# Patient Record
Sex: FEMALE | Race: WHITE | NOT HISPANIC OR LATINO | Employment: OTHER | URBAN - METROPOLITAN AREA
[De-identification: names, ages, dates, MRNs, and addresses within clinical notes are randomized per-mention and may not be internally consistent; named-entity substitution may affect disease eponyms.]

---

## 2021-03-04 ENCOUNTER — IMMUNIZATIONS (OUTPATIENT)
Dept: FAMILY MEDICINE CLINIC | Facility: HOSPITAL | Age: 66
End: 2021-03-04

## 2021-03-04 DIAGNOSIS — Z23 ENCOUNTER FOR IMMUNIZATION: Primary | ICD-10-CM

## 2021-03-04 PROCEDURE — 0001A SARS-COV-2 / COVID-19 MRNA VACCINE (PFIZER-BIONTECH) 30 MCG: CPT

## 2021-03-04 PROCEDURE — 91300 SARS-COV-2 / COVID-19 MRNA VACCINE (PFIZER-BIONTECH) 30 MCG: CPT

## 2021-03-25 ENCOUNTER — IMMUNIZATIONS (OUTPATIENT)
Dept: FAMILY MEDICINE CLINIC | Facility: HOSPITAL | Age: 66
End: 2021-03-25

## 2021-03-25 DIAGNOSIS — Z23 ENCOUNTER FOR IMMUNIZATION: Primary | ICD-10-CM

## 2021-03-25 PROCEDURE — 0002A SARS-COV-2 / COVID-19 MRNA VACCINE (PFIZER-BIONTECH) 30 MCG: CPT

## 2021-03-25 PROCEDURE — 91300 SARS-COV-2 / COVID-19 MRNA VACCINE (PFIZER-BIONTECH) 30 MCG: CPT

## 2021-08-08 ENCOUNTER — OFFICE VISIT (OUTPATIENT)
Dept: URGENT CARE | Facility: CLINIC | Age: 66
End: 2021-08-08
Payer: MEDICARE

## 2021-08-08 VITALS
TEMPERATURE: 98.3 F | HEART RATE: 76 BPM | SYSTOLIC BLOOD PRESSURE: 118 MMHG | RESPIRATION RATE: 14 BRPM | WEIGHT: 143 LBS | DIASTOLIC BLOOD PRESSURE: 78 MMHG | OXYGEN SATURATION: 97 %

## 2021-08-08 DIAGNOSIS — H18.892 CORNEAL IRRITATION OF LEFT EYE: Primary | ICD-10-CM

## 2021-08-08 PROCEDURE — 99204 OFFICE O/P NEW MOD 45 MIN: CPT | Performed by: PHYSICIAN ASSISTANT

## 2021-08-08 PROCEDURE — G0463 HOSPITAL OUTPT CLINIC VISIT: HCPCS | Performed by: PHYSICIAN ASSISTANT

## 2021-08-08 RX ORDER — BACILLUS COAGULANS/INULIN 1B-250 MG
CAPSULE ORAL EVERY 24 HOURS
COMMUNITY

## 2021-08-08 NOTE — PATIENT INSTRUCTIONS
Do the cold compresses for 20 minutes 3-4x a day   Continue to flush   If you feel worse go to the ER otherwise follow with your eye  tomorrow   The [facility] is closest

## 2021-08-08 NOTE — PROGRESS NOTES
[id number]        NAME: Savannah Patricio is a 78 y o  female  : 1955    MRN: [id number]  DATE: 2021  TIME: 1:21 PM    Assessment and Plan   Corneal irritation of left eye [H18 [id number]]  1  Corneal irritation of left eye       ER if you do not improve or get worse by tonight       Patient Instructions   Patient Instructions   Do the cold compresses for 20 minutes 3-4x a day   Continue to flush   If you feel worse go to the ER otherwise follow with your eye dr tomorrow   The [facility] is closest         Follow up with PCP in 3-5 days  Proceed to  ER if symptoms worsen  Chief Complaint     Chief Complaint   Patient presents with    Eye Injury     accidently put hydrogen peroxide in left eye when attempting to remove contact lense 1 day ago  Rinsing eye with saline solution  Redness noted, not affecting vision         History of Present Illness       The patient is a 41-year-old female presenting today after accidentally rinsing her eye with hydrogen peroxide  She was attempting to remove a contact lens 1 day ago  She is doing rinses with saline solution but has noticed increased redness of the left eye  It is not affecting her vision and is not painful  The eye feels itchy now  This morning she woke up with discharge from the eye  Her eye is puffing out  She did do many saline rinses  The pt used a clear eye solution which is 3% hydrogen peroxide  Review of Systems   Review of Systems   Eyes: Positive for discharge, redness and itching  Negative for photophobia, pain and visual disturbance           Current Medications       Current Outpatient Medications:     Bacillus Coagulans-Inulin (Probiotic) 1-250 BILLION-MG CAPS, every 24 hours, Disp: , Rfl:     Cholecalciferol 25 MCG (1000 UT) capsule, Take 1,000 Units by mouth daily, Disp: , Rfl:     sertraline (Zoloft) 50 mg tablet, every 24 hours, Disp: , Rfl:     Current Allergies     Allergies as of 2021 - Reviewed 08/08/2021   Allergen Reaction Noted    Ampicillin Rash 10/22/2020    Sulfamethoxazole-trimethoprim Rash 08/08/2021            The following portions of the patient's history were reviewed and updated as appropriate: allergies, current medications, past family history, past medical history, past social history, past surgical history and problem list      History reviewed  No pertinent past medical history  History reviewed  No pertinent surgical history  History reviewed  No pertinent family history  Medications have been verified  Objective   /78   Pulse 76   Temp 98 3 °F (36 8 °C)   Resp 14   Wt 64 9 kg (143 lb)   SpO2 97%        Physical Exam     Physical Exam  Vitals reviewed  Constitutional:       General: She is not in acute distress  Appearance: Normal appearance  She is normal weight  She is not ill-appearing, toxic-appearing or diaphoretic  Eyes:      General: Lids are normal  No allergic shiner or visual field deficit  Right eye: No foreign body, discharge or hordeolum  Left eye: No foreign body, discharge or hordeolum  Extraocular Movements:      Right eye: Normal extraocular motion and no nystagmus  Left eye: Normal extraocular motion and no nystagmus  Conjunctiva/sclera:      Right eye: Right conjunctiva is injected  Chemosis present  No exudate or hemorrhage  Left eye: Left conjunctiva is not injected  No chemosis, exudate or hemorrhage  Neurological:      Mental Status: She is alert                 Eye stained and there are no corneal abrasions

## 2022-06-14 ENCOUNTER — APPOINTMENT (EMERGENCY)
Dept: RADIOLOGY | Facility: HOSPITAL | Age: 67
End: 2022-06-14
Payer: MEDICARE

## 2022-06-14 ENCOUNTER — APPOINTMENT (EMERGENCY)
Dept: CT IMAGING | Facility: HOSPITAL | Age: 67
End: 2022-06-14
Payer: MEDICARE

## 2022-06-14 ENCOUNTER — HOSPITAL ENCOUNTER (EMERGENCY)
Facility: HOSPITAL | Age: 67
Discharge: HOME | End: 2022-06-14
Attending: EMERGENCY MEDICINE | Admitting: EMERGENCY MEDICINE
Payer: MEDICARE

## 2022-06-14 VITALS
DIASTOLIC BLOOD PRESSURE: 67 MMHG | SYSTOLIC BLOOD PRESSURE: 149 MMHG | HEART RATE: 77 BPM | OXYGEN SATURATION: 97 % | RESPIRATION RATE: 17 BRPM | TEMPERATURE: 97.7 F

## 2022-06-14 DIAGNOSIS — N20.1 URETEROLITHIASIS: Primary | ICD-10-CM

## 2022-06-14 DIAGNOSIS — R93.89 ABNORMAL CT SCAN: ICD-10-CM

## 2022-06-14 LAB
ALBUMIN SERPL BCP-MCNC: 4.5 G/DL (ref 3.5–5)
ALP SERPL-CCNC: 81 U/L (ref 34–104)
ALT SERPL W P-5'-P-CCNC: 15 U/L (ref 7–52)
ANION GAP SERPL CALCULATED.3IONS-SCNC: 11 MMOL/L (ref 4–13)
APTT PPP: 31 SECONDS (ref 23–37)
AST SERPL W P-5'-P-CCNC: 23 U/L (ref 13–39)
BACTERIA UR QL AUTO: ABNORMAL /HPF
BASOPHILS # BLD AUTO: 0.04 THOUSANDS/ΜL (ref 0–0.1)
BASOPHILS NFR BLD AUTO: 0 % (ref 0–1)
BILIRUB SERPL-MCNC: 0.58 MG/DL (ref 0.2–1)
BILIRUB UR QL STRIP: NEGATIVE
BUN SERPL-MCNC: 15 MG/DL (ref 5–25)
CALCIUM SERPL-MCNC: 9.4 MG/DL (ref 8.4–10.2)
CHLORIDE SERPL-SCNC: 98 MMOL/L (ref 96–108)
CLARITY UR: ABNORMAL
CO2 SERPL-SCNC: 25 MMOL/L (ref 21–32)
COLOR UR: YELLOW
CREAT SERPL-MCNC: 0.69 MG/DL (ref 0.6–1.3)
EOSINOPHIL # BLD AUTO: 0.08 THOUSAND/ΜL (ref 0–0.61)
EOSINOPHIL NFR BLD AUTO: 1 % (ref 0–6)
ERYTHROCYTE [DISTWIDTH] IN BLOOD BY AUTOMATED COUNT: 13.6 % (ref 11.6–15.1)
GFR SERPL CREATININE-BSD FRML MDRD: 89 ML/MIN/1.73SQ M
GLUCOSE SERPL-MCNC: 107 MG/DL (ref 65–140)
GLUCOSE UR STRIP-MCNC: NEGATIVE MG/DL
HCT VFR BLD AUTO: 41.5 % (ref 34.8–46.1)
HGB BLD-MCNC: 13.4 G/DL (ref 11.5–15.4)
HGB UR QL STRIP.AUTO: ABNORMAL
IMM GRANULOCYTES # BLD AUTO: 0.05 THOUSAND/UL (ref 0–0.2)
IMM GRANULOCYTES NFR BLD AUTO: 0 % (ref 0–2)
INR PPP: 1.06 (ref 0.84–1.19)
KETONES UR STRIP-MCNC: ABNORMAL MG/DL
LEUKOCYTE ESTERASE UR QL STRIP: NEGATIVE
LYMPHOCYTES # BLD AUTO: 1.93 THOUSANDS/ΜL (ref 0.6–4.47)
LYMPHOCYTES NFR BLD AUTO: 16 % (ref 14–44)
MCH RBC QN AUTO: 29.6 PG (ref 26.8–34.3)
MCHC RBC AUTO-ENTMCNC: 32.3 G/DL (ref 31.4–37.4)
MCV RBC AUTO: 92 FL (ref 82–98)
MONOCYTES # BLD AUTO: 0.59 THOUSAND/ΜL (ref 0.17–1.22)
MONOCYTES NFR BLD AUTO: 5 % (ref 4–12)
NEUTROPHILS # BLD AUTO: 9.78 THOUSANDS/ΜL (ref 1.85–7.62)
NEUTS SEG NFR BLD AUTO: 78 % (ref 43–75)
NITRITE UR QL STRIP: NEGATIVE
NON-SQ EPI CELLS URNS QL MICRO: ABNORMAL /HPF
NRBC BLD AUTO-RTO: 0 /100 WBCS
PH UR STRIP.AUTO: 6 [PH]
PLATELET # BLD AUTO: 353 THOUSANDS/UL (ref 149–390)
PMV BLD AUTO: 10.5 FL (ref 8.9–12.7)
POTASSIUM SERPL-SCNC: 3.8 MMOL/L (ref 3.5–5.3)
PROT SERPL-MCNC: 7.8 G/DL (ref 6.4–8.4)
PROT UR STRIP-MCNC: NEGATIVE MG/DL
PROTHROMBIN TIME: 13.7 SECONDS (ref 11.6–14.5)
RBC # BLD AUTO: 4.53 MILLION/UL (ref 3.81–5.12)
RBC #/AREA URNS AUTO: ABNORMAL /HPF
SODIUM SERPL-SCNC: 134 MMOL/L (ref 135–147)
SP GR UR STRIP.AUTO: 1.02 (ref 1–1.03)
UROBILINOGEN UR QL STRIP.AUTO: 0.2 E.U./DL
WBC # BLD AUTO: 12.47 THOUSAND/UL (ref 4.31–10.16)
WBC #/AREA URNS AUTO: ABNORMAL /HPF

## 2022-06-14 PROCEDURE — 99285 EMERGENCY DEPT VISIT HI MDM: CPT | Performed by: EMERGENCY MEDICINE

## 2022-06-14 PROCEDURE — 80053 COMPREHEN METABOLIC PANEL: CPT | Performed by: EMERGENCY MEDICINE

## 2022-06-14 PROCEDURE — 99284 EMERGENCY DEPT VISIT MOD MDM: CPT

## 2022-06-14 PROCEDURE — 81001 URINALYSIS AUTO W/SCOPE: CPT | Performed by: EMERGENCY MEDICINE

## 2022-06-14 PROCEDURE — 81003 URINALYSIS AUTO W/O SCOPE: CPT | Performed by: EMERGENCY MEDICINE

## 2022-06-14 PROCEDURE — 36415 COLL VENOUS BLD VENIPUNCTURE: CPT | Performed by: EMERGENCY MEDICINE

## 2022-06-14 PROCEDURE — 96360 HYDRATION IV INFUSION INIT: CPT

## 2022-06-14 PROCEDURE — 85730 THROMBOPLASTIN TIME PARTIAL: CPT | Performed by: EMERGENCY MEDICINE

## 2022-06-14 PROCEDURE — 85610 PROTHROMBIN TIME: CPT | Performed by: EMERGENCY MEDICINE

## 2022-06-14 PROCEDURE — G1004 CDSM NDSC: HCPCS

## 2022-06-14 PROCEDURE — 85025 COMPLETE CBC W/AUTO DIFF WBC: CPT | Performed by: EMERGENCY MEDICINE

## 2022-06-14 PROCEDURE — 74176 CT ABD & PELVIS W/O CONTRAST: CPT

## 2022-06-14 PROCEDURE — 71045 X-RAY EXAM CHEST 1 VIEW: CPT

## 2022-06-14 RX ADMIN — SODIUM CHLORIDE 1000 ML: 0.9 INJECTION, SOLUTION INTRAVENOUS at 17:36

## 2022-06-14 NOTE — ED PROVIDER NOTES
History  Chief Complaint   Patient presents with    Flank Pain     Pt reports left flank pain that started this afternoon  Pt also reports urinary frequency since yesterday     Patient is a 75year old female who presents for evaluation of left-sided abdominal/left flank pain  Patient says that she initially had the discomfort on Sunday but went away  Patient says that the symptoms reoccurred today and were worse  She also admits to increased urinary frequency and feels like as soon as she goes she has to go again" and feels like she has increased urgency  She admitted to some pressure with urination but denies any hematuria, dysuria  She denies any fevers, chills, nausea, vomiting  The patient also admits to a chronic cough over the past 3 weeks, she saw her family doctor for and everything was normal   She denies any chest pain, shortness of breath  Prior to Admission Medications   Prescriptions Last Dose Informant Patient Reported? Taking? Bacillus Coagulans-Inulin (Probiotic) 1-250 BILLION-MG CAPS   Yes No   Sig: every 24 hours   Cholecalciferol 25 MCG (1000 UT) capsule   Yes No   Sig: Take 1,000 Units by mouth daily   sertraline (Zoloft) 50 mg tablet   Yes No   Sig: every 24 hours      Facility-Administered Medications: None       History reviewed  No pertinent past medical history  History reviewed  No pertinent surgical history  History reviewed  No pertinent family history  I have reviewed and agree with the history as documented  E-Cigarette/Vaping    E-Cigarette Use Never User      E-Cigarette/Vaping Substances     Social History     Tobacco Use    Smoking status: Never Smoker    Smokeless tobacco: Never Used   Vaping Use    Vaping Use: Never used   Substance Use Topics    Alcohol use: Not Currently    Drug use: Never       Review of Systems   Constitutional: Negative for fever and unexpected weight change     HENT: Negative for congestion, ear pain, sore throat and trouble swallowing  Eyes: Negative for pain and redness  Respiratory: Negative for cough, chest tightness and shortness of breath  Cardiovascular: Negative for chest pain and leg swelling  Gastrointestinal: Positive for abdominal pain (left sided)  Negative for abdominal distention, diarrhea and vomiting  Endocrine: Negative for polyuria  Genitourinary: Positive for flank pain, frequency and urgency  Negative for dysuria, hematuria, pelvic pain and vaginal bleeding  Musculoskeletal: Negative for back pain and myalgias  Skin: Negative for rash  Neurological: Negative for dizziness, syncope, weakness, light-headedness and headaches  Physical Exam  Physical Exam  Vitals and nursing note reviewed  Constitutional:       General: She is not in acute distress  Appearance: She is well-developed  HENT:      Head: Normocephalic and atraumatic  Right Ear: External ear normal       Left Ear: External ear normal       Nose: Nose normal       Mouth/Throat:      Mouth: Mucous membranes are moist       Pharynx: No oropharyngeal exudate  Eyes:      Conjunctiva/sclera: Conjunctivae normal       Pupils: Pupils are equal, round, and reactive to light  Cardiovascular:      Rate and Rhythm: Normal rate and regular rhythm  Heart sounds: Normal heart sounds  No murmur heard  No friction rub  No gallop  Pulmonary:      Effort: Pulmonary effort is normal  No respiratory distress  Breath sounds: Normal breath sounds  No wheezing or rales  Abdominal:      General: There is no distension  Palpations: Abdomen is soft  Tenderness: There is no abdominal tenderness  There is left CVA tenderness  There is no guarding  Musculoskeletal:         General: No swelling, tenderness or deformity  Normal range of motion  Cervical back: Normal range of motion and neck supple  Lymphadenopathy:      Cervical: No cervical adenopathy  Skin:     General: Skin is warm and dry  Neurological:      General: No focal deficit present  Mental Status: She is alert and oriented to person, place, and time  Mental status is at baseline  Cranial Nerves: No cranial nerve deficit  Sensory: No sensory deficit  Motor: No weakness or abnormal muscle tone        Coordination: Coordination normal          Vital Signs  ED Triage Vitals [06/14/22 1645]   Temperature Pulse Respirations Blood Pressure SpO2   97 7 °F (36 5 °C) 77 17 149/67 97 %      Temp Source Heart Rate Source Patient Position - Orthostatic VS BP Location FiO2 (%)   Tympanic Monitor -- Right arm --      Pain Score       7           Vitals:    06/14/22 1645   BP: 149/67   Pulse: 77         Visual Acuity      ED Medications  Medications   sodium chloride 0 9 % bolus 1,000 mL (0 mL Intravenous Stopped 6/14/22 1836)       Diagnostic Studies  Results Reviewed     Procedure Component Value Units Date/Time    Comprehensive metabolic panel [[id number]]  (Abnormal) Collected: 06/14/22 1736    Lab Status: Final result Specimen: Blood from Arm, Right Updated: 06/14/22 1806     Sodium 134 mmol/L      Potassium 3 8 mmol/L      Chloride 98 mmol/L      CO2 25 mmol/L      ANION GAP 11 mmol/L      BUN 15 mg/dL      Creatinine 0 69 mg/dL      Glucose 107 mg/dL      Calcium 9 4 mg/dL      AST 23 U/L      ALT 15 U/L      Alkaline Phosphatase 81 U/L      Total Protein 7 8 g/dL      Albumin 4 5 g/dL      Total Bilirubin 0 58 mg/dL      eGFR 89 ml/min/1 73sq m     Narrative:      [place] guidelines for Chronic Kidney Disease (CKD):     Stage 1 with normal or high GFR (GFR > 90 mL/min/1 73 square meters)    Stage 2 Mild CKD (GFR = 60-89 mL/min/1 73 square meters)    Stage 3A Moderate CKD (GFR = 45-59 mL/min/1 73 square meters)    Stage 3B Moderate CKD (GFR = 30-44 mL/min/1 73 square meters)    Stage 4 Severe CKD (GFR = 15-29 mL/min/1 73 square meters)    Stage 5 End Stage CKD (GFR <15 mL/min/1 73 square meters)  Note: GFR calculation is accurate only with a steady state creatinine    Protime-INR [[id number]]  (Normal) Collected: 06/14/22 1736    Lab Status: Final result Specimen: Blood from Arm, Right Updated: 06/14/22 1758     Protime 13 7 seconds      INR 1 06    APTT [[id number]]  (Normal) Collected: 06/14/22 1736    Lab Status: Final result Specimen: Blood from Arm, Right Updated: 06/14/22 1758     PTT 31 seconds     Urine Microscopic [[id number]]  (Abnormal) Collected: 06/14/22 1736    Lab Status: Final result Specimen: Urine, Clean Catch Updated: 06/14/22 1758     RBC, UA 30-50 /hpf      WBC, UA 1-2 /hpf      Epithelial Cells Occasional /hpf      Bacteria, UA None Seen /hpf     UA w Reflex to Microscopic w Reflex to Culture [[id number]]  (Abnormal) Collected: 06/14/22 1736    Lab Status: Final result Specimen: Urine, Clean Catch Updated: 06/14/22 1747     Color, UA Yellow     Clarity, UA Slightly Cloudy     Specific Gravity, UA 1 025     pH, UA 6 0     Leukocytes, UA Negative     Nitrite, UA Negative     Protein, UA Negative mg/dl      Glucose, UA Negative mg/dl      Ketones, UA 15 (1+) mg/dl      Urobilinogen, UA 0 2 E U /dl      Bilirubin, UA Negative     Blood, UA 3+    CBC and differential [[id number]]  (Abnormal) Collected: 06/14/22 1736    Lab Status: Final result Specimen: Blood from Arm, Right Updated: 06/14/22 1744     WBC 12 47 Thousand/uL      RBC 4 53 Million/uL      Hemoglobin 13 4 g/dL      Hematocrit 41 5 %      MCV 92 fL      MCH 29 6 pg      MCHC 32 3 g/dL      RDW 13 6 %      MPV 10 5 fL      Platelets 011 Thousands/uL      nRBC 0 /100 WBCs      Neutrophils Relative 78 %      Immat GRANS % 0 %      Lymphocytes Relative 16 %      Monocytes Relative 5 %      Eosinophils Relative 1 %      Basophils Relative 0 %      Neutrophils Absolute 9 78 Thousands/µL      Immature Grans Absolute 0 05 Thousand/uL      Lymphocytes Absolute 1 93 Thousands/µL      Monocytes Absolute 0 59 Thousand/µL      Eosinophils Absolute 0 08 Thousand/µL      Basophils Absolute 0 04 Thousands/µL                  XR chest 1 view   ED Interpretation by Gracie Lares DO (06/14 1721)   No acute cardiopulmonary disease       CT abdomen pelvis wo contrast   Final Result by Nenita Meyer MD (06/14 1737)   1   4 mm left UVJ calculus identified resulting in mild left hydroureteronephrosis without further urinary calculi  2   Expansile destructive right iliac bone lesion with soft tissue component and chondroid matrix measuring up to 3 5 x 3 9 cm 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 without additional bone lesion identified  Follow-up PET/CT would likely    be helpful for treatment planning  3   Cholelithiasis  4   Moderate to large hiatal hernia  The study was marked in EPIC for significant notification  Workstation performed: [id number]                    Procedures  Procedures         ED Course  ED Course as of 06/14/22 1957 Tue Jun 14, 2022 1816 Spoke with patient and  about the CT of the abdomen pelvis findings  Explained to them that there is a high concern for cancer  Explained to them the importance of following up with the hematologist oncologist in regards to these findings  They are understanding of the seriousness of these findings and the need for follow-up as soon as possible  In regards to the kidney stone, patient does not feels if she needs pain meds for home  She will continue with Tylenol Motrin  Told her it is important that she follows up with a urologist as well  MDM  Number of Diagnoses or Management Options  Abnormal CT scan  Ureterolithiasis  Diagnosis management comments: 59-year-old female presenting for evaluation of left flank/left-sided abdominal pain  Had episode on Sunday which went away  Symptoms returned today  Admits to increased urinary frequency and urgency  Denies fevers, chills, nausea, vomiting    Does have a history of kidney stones  Vitals within normal limits  CVA tenderness on exam, no abdominal tenderness  Will obtain basic labs, UA, CT abdomen pelvis without contrast   Patient declined pain meds at this time  Labs show mildly elevated white count  UA shows no signs of infection  Kidney function within normal limits  CT shows 4 mm nonobstructing stone  Patient's pain is controlled in department  Updated patient on other CT findings and the need for follow-up  See ED workup for further information  Disposition  Final diagnoses:   Ureterolithiasis   Abnormal CT scan     Time reflects when diagnosis was documented in both MDM as applicable and the Disposition within this note     Time User Action Codes Description Comment    6/14/2022  6:00 PM Nik Santamaria Add [N20 1] Ureterolithiasis     6/14/2022  6:03 PM Nik Santamaria Add [R93 89] Abnormal CT scan       ED Disposition     ED Disposition   Discharge    Condition   Stable    Date/Time   Tue Jun 14, 2022  6:00 PM    [address] discharge to home/self care                 Follow-up Information     Follow up With Specialties Details Why Contact Info    Erwin Jansen MD Urology Schedule an appointment as soon as possible for a visit  For follow up of kidney stone [address] [place] [address]      Roshan Gillis MD Hematology and Oncology, Hematology, Oncology Schedule an appointment as soon as possible for a visit  For follow up of CT abdomen findings [address]  [address] [address] [address]  [phone]            Discharge Medication List as of 6/14/2022  6:06 PM      CONTINUE these medications which have NOT CHANGED    Details   Bacillus Coagulans-Inulin (Probiotic) 1-250 BILLION-MG CAPS every 24 hours, Historical Med      Cholecalciferol 25 MCG (1000 UT) capsule Take 1,000 Units by mouth daily, Historical Med      sertraline (Zoloft) 50 mg tablet every 24 hours, Historical Med                 PDMP Review     None ED Provider  Electronically Signed by           Inderjit Kirk DO  06/14/22 1957

## 2022-06-22 ENCOUNTER — TELEPHONE (OUTPATIENT)
Dept: HEMATOLOGY ONCOLOGY | Facility: CLINIC | Age: 67
End: 2022-06-22

## 2022-06-27 ENCOUNTER — TELEPHONE (OUTPATIENT)
Dept: HEMATOLOGY ONCOLOGY | Facility: CLINIC | Age: 67
End: 2022-06-27

## 2022-06-27 NOTE — TELEPHONE ENCOUNTER
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 Advised this may not happen until apt even though she has been trying with her PCP and other docs from [facility]  Pt was told there is a sarcoma specialist in [facility] and wondering if she should go there to be seen?

## 2022-06-27 NOTE — TELEPHONE ENCOUNTER
CALL RETURN FORM   Reason for patient call? Patient has medical questions about ct scans and biopsy   Patient's primary oncologist?  Jorge Kang   Name of person the patient was calling for? agostino   Any additional information to add, if applicable? Please call [phone]   Informed patient that the message will be forwarded to the team and someone will get back to them as soon as possible    Did you relay this information to the patient?  yes

## 2022-06-28 ENCOUNTER — TELEPHONE (OUTPATIENT)
Dept: HEMATOLOGY ONCOLOGY | Facility: HOSPITAL | Age: 67
End: 2022-06-28

## 2022-06-28 NOTE — TELEPHONE ENCOUNTER
Returned call to pt and advised of 2pm appointment tomorrow to see Dr Marli Sutherland can we please schedule this pt to be seen by Dr Marli Hopson tomorrow at 2pm in [facility] thank you!

## 2022-06-29 ENCOUNTER — TELEPHONE (OUTPATIENT)
Dept: HEMATOLOGY ONCOLOGY | Facility: HOSPITAL | Age: 67
End: 2022-06-29

## 2022-06-29 ENCOUNTER — CONSULT (OUTPATIENT)
Dept: HEMATOLOGY ONCOLOGY | Facility: HOSPITAL | Age: 67
End: 2022-06-29
Attending: EMERGENCY MEDICINE
Payer: MEDICARE

## 2022-06-29 ENCOUNTER — PREP FOR PROCEDURE (OUTPATIENT)
Dept: INTERVENTIONAL RADIOLOGY/VASCULAR | Facility: CLINIC | Age: 67
End: 2022-06-29

## 2022-06-29 VITALS
HEART RATE: 75 BPM | TEMPERATURE: 97.2 F | WEIGHT: 143 LBS | RESPIRATION RATE: 16 BRPM | BODY MASS INDEX: 30.02 KG/M2 | SYSTOLIC BLOOD PRESSURE: 110 MMHG | DIASTOLIC BLOOD PRESSURE: 66 MMHG | OXYGEN SATURATION: 99 % | HEIGHT: 58 IN

## 2022-06-29 DIAGNOSIS — C41.4 MALIGNANT NEOPLASM OF PELVIC BONES, SACRUM AND COCCYX (HCC): Primary | ICD-10-CM

## 2022-06-29 DIAGNOSIS — R19.00 PELVIC MASS: Primary | ICD-10-CM

## 2022-06-29 DIAGNOSIS — R93.89 ABNORMAL CT SCAN: ICD-10-CM

## 2022-06-29 PROCEDURE — 99205 OFFICE O/P NEW HI 60 MIN: CPT | Performed by: INTERNAL MEDICINE

## 2022-06-29 NOTE — TELEPHONE ENCOUNTER
Called IR spoke to [facility] let me know their dr's need to review the order then will reach out to the patient to schedule  Patient is aware and will be waiting for the call

## 2022-06-30 ENCOUNTER — TELEPHONE (OUTPATIENT)
Dept: GENETICS | Facility: CLINIC | Age: 67
End: 2022-06-30

## 2022-06-30 ENCOUNTER — TELEPHONE (OUTPATIENT)
Dept: HEMATOLOGY ONCOLOGY | Facility: HOSPITAL | Age: 67
End: 2022-06-30

## 2022-06-30 NOTE — TELEPHONE ENCOUNTER
I called [facility] to schedule a new patient appointment with the [facility]       Outcome:   I left a voice message encouraging the patient to call the genetics team at [phone] to schedule this appointment  Follow-up: We will make one more attempt to reach the patient

## 2022-06-30 NOTE — TELEPHONE ENCOUNTER
Called patient and spoke to patient  IR moved up the biopsy appt and I moved up f/u appt  Patient was very happy the appts were moved up and was greatly appreciated of it

## 2022-06-30 NOTE — TELEPHONE ENCOUNTER
Called patient and spoke to patient  I let patient know f/u appt date and time and let patient know I called IR to see if they can move up her biopsy appt  IR will reach out to patient if they can move up appt  I let patient know that and then I would move up the f/u appt  Patient confirmed

## 2022-07-02 NOTE — H&P (VIEW-ONLY)
Oncology Outpatient Consult Note  Socorro Edwards 75 y o  female MRN: @ Encounter: [id number]        Date:  7/2/2022        CC:  Expansile destructive lesion in right iliac bone      HPI:  Veronica Valle is seen for initial consultation 7/2/2022 regarding an incidentally found explasive destructive lesion in the right iliac bone  She was in the ER in mid June with left sided flank pain and urinary frequency  She has a kidney stone on the left and the iliac lesions was also seen on CT  She has not had a biopsy yet because she was having difficulty getting an order for it, so I agreed to see her without a diagnosis  She had a PET scan at [place] that did not show any other sites of disease  She is otherwise healthy  Her only other medical history is depression  Her labs are normal   She had a normal mammogram in dec 2021 and a normal colonoscopy last year  She denies any pain  She has a dry cough that is improving on its own  She denies any bowel problems or BRBPR  She has fatigue  She doesn't smoke or drink  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 a myeloma survivor  Her famhx his remarkable for:    PGF - colon cancer age 80  Father - prostate cancer age 61 and a small cell carcinoma of the rectum age 79  Brother - prostate cancer age 76  Brother - prostate cancer in his 46s  MGM - uterine cancer in her [de-identified]    No AJ heritage  ROS: As stated in history of present illness otherwise her 14 point review of systems today was negative  ECOG PS: 0        Test Results:    Imaging: CT abdomen pelvis wo contrast    Result Date: 6/14/2022  Narrative: CT ABDOMEN AND PELVIS WITHOUT IV CONTRAST INDICATION:   Flank pain, kidney stone suspected left flank pain  COMPARISON:  None   TECHNIQUE:  CT examination of the abdomen and pelvis was performed without intravenous contrast  This examination was performed without intravenous contrast in the context of the critical nationwide Omnipaque shortage  Axial, sagittal, and coronal 2D reformatted images were created from the source data and submitted for interpretation  Radiation dose length product (DLP) for this visit:  538 36 mGy-cm   This examination, like all CT scans performed in the [facility], was performed utilizing techniques to minimize radiation dose exposure, including the use of iterative  reconstruction and automated exposure control  Enteric contrast was administered  FINDINGS: ABDOMEN LOWER CHEST:  Streaky atelectasis or scarring noted at the lung bases  Moderate to large sliding-type hiatal hernia noted  LIVER/BILIARY TREE:  Unremarkable  GALLBLADDER:  There are gallstone(s) within the gallbladder, without pericholecystic inflammatory changes  SPLEEN:  Unremarkable  PANCREAS:  Unremarkable  ADRENAL GLANDS:  Unremarkable  KIDNEYS/URETERS:  4 mm left UVJ calculus resulting in mild left hydroureteronephrosis  STOMACH AND BOWEL:  Unremarkable  APPENDIX:  A normal appendix was visualized  ABDOMINOPELVIC CAVITY:  No ascites  No pneumoperitoneum  No lymphadenopathy  VESSELS:  Unremarkable for patient's age  PELVIS REPRODUCTIVE ORGANS:  Unremarkable for patient's age  URINARY BLADDER:  Unremarkable  ABDOMINAL WALL/INGUINAL REGIONS:  Unremarkable  OSSEOUS STRUCTURES:  Right iliac bone lytic destructive lesion with soft tissue component measuring approximately 3 5 x 3 9 cm  Chondroid matrix noted  Findings raise suspicion for chondrosarcoma or metastatic disease  No additional bone lesions identified or suspected primary carcinoma  Impression: 1   4 mm left UVJ calculus identified resulting in mild left hydroureteronephrosis without further urinary calculi  2   Expansile destructive right iliac bone lesion with soft tissue component and chondroid matrix measuring up to 3 5 x 3 9 cm 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 without additional bone lesion identified    Follow-up PET/CT would likely be helpful for treatment planning  3   Cholelithiasis  4   Moderate to large hiatal hernia  The study was marked in EPIC for significant notification  Workstation performed: [id number]     XR chest 1 view    Result Date: 6/15/2022  Narrative: CHEST INDICATION:   chronic cough  COMPARISON:  None EXAM PERFORMED/VIEWS:  XR CHEST 1 VIEW 1 image FINDINGS: Cardiomediastinal silhouette appears unremarkable  A hiatal hernia is present  The lungs are clear  No pneumothorax or pleural effusion  Mild dextroscoliosis in the lumbar spine  Impression: No acute cardiopulmonary disease  Workstation performed: [id number]     NM PET outside images    Result Date: 6/28/2022  Narrative: 1 2 840 [id number]  3 228 3 [id number]  8 [id number]      Labs:   Lab Results   Component Value Date    WBC 12 47 (H) 06/14/2022    HGB 13 4 06/14/2022    HCT 41 5 06/14/2022    MCV 92 06/14/2022     06/14/2022     Lab Results   Component Value Date    K 3 8 06/14/2022    CL 98 06/14/2022    CO2 25 06/14/2022    BUN 15 06/14/2022    CREATININE 0 69 06/14/2022    CALCIUM 9 4 06/14/2022    AST 23 06/14/2022    ALT 15 06/14/2022    ALKPHOS 81 06/14/2022    EGFR 89 06/14/2022         No results found for: SPEP, UPEP    No results found for: PSA    No results found for: CEA    No results found for: AFP    No results found for: IRON, TIBC, FERRITIN    No results found for: [id number]            Active Problems:   Patient Active Problem List   Diagnosis    Malignant neoplasm of pelvic bones, sacrum and coccyx ([address] )       Past Medical History: No past medical history on file  Surgical History: No past surgical history on file  Family History:  No family history on file  Cancer-related family history is not on file      Social History:   Social History     Socioeconomic History    Marital status: /Civil Union     Spouse name: Not on file    Number of children: Not on file    Years of education: Not on file    Highest education level: Not on file Occupational History    Not on file   Tobacco Use    Smoking status: Never Smoker    Smokeless tobacco: Never Used   Vaping Use    Vaping Use: Never used   Substance and Sexual Activity    Alcohol use: Not Currently    Drug use: Never    Sexual activity: Not on file   Other Topics Concern    Not on file   Social History Narrative    Not on file     Social Determinants of Health     Financial Resource Strain: Not on file   Food Insecurity: Not on file   Transportation Needs: Not on file   Physical Activity: Not on file   Stress: Not on file   Social Connections: Not on file   Intimate Partner Violence: Not on file   Housing Stability: Not on file       Current Medications:   Current Outpatient Medications   Medication Sig Dispense Refill    Bacillus Coagulans-Inulin (Probiotic) 1-250 BILLION-MG CAPS every 24 hours      Cholecalciferol 25 MCG (1000 UT) capsule Take 1,000 Units by mouth daily      sertraline (ZOLOFT) 50 mg tablet every 24 hours       No current facility-administered medications for this visit  Allergies: Allergies   Allergen Reactions    Ampicillin Rash    Neomycin-Polymyxin-Hc Rash    Sulfa Antibiotics Rash    Sulfamethoxazole-Trimethoprim Rash         Physical Exam:    Body surface area is 1 58 meters squared  Wt Readings from Last 3 Encounters:   06/29/22 64 9 kg (143 lb)   08/08/21 64 9 kg (143 lb)        Temp Readings from Last 3 Encounters:   06/29/22 (!) 97 2 °F (36 2 °C) (Tympanic)   06/14/22 97 7 °F (36 5 °C) (Tympanic)   08/08/21 98 3 °F (36 8 °C)        BP Readings from Last 3 Encounters:   06/29/22 110/66   06/14/22 149/67   08/08/21 118/78         Pulse Readings from Last 3 Encounters:   06/29/22 75   06/14/22 77   08/08/21 76     @LASTSAO2(3)@    Physical Exam     Constitutional   General appearance: No acute distress, well appearing and well nourished  Eyes   Conjunctiva and lids: No swelling, erythema or discharge      Pupils and irises: Equal, round and reactive to light  Ears, Nose, Mouth, and Throat   External inspection of ears and nose: Normal     Nasal mucosa, septum, and turbinates: Normal without edema or erythema  Oropharynx: Normal with no erythema, edema, exudate or lesions  Pulmonary   Respiratory effort: No increased work of breathing or signs of respiratory distress  Auscultation of lungs: Clear to auscultation  Cardiovascular   Palpation of heart: Normal PMI, no thrills  Auscultation of heart: Normal rate and rhythm, normal S1 and S2, without murmurs  Examination of extremities for edema and/or varicosities: Normal     Carotid pulses: Normal     Abdomen   Abdomen: Non-tender, no masses  Liver and spleen: No hepatomegaly or splenomegaly  Lymphatic   Palpation of lymph nodes in neck: No lymphadenopathy  Musculoskeletal   Gait and station: Normal     Digits and nails: Normal without clubbing or cyanosis  Inspection/palpation of joints, bones, and muscles: Normal     Skin   Skin and subcutaneous tissue: Normal without rashes or lesions  Neurologic   Cranial nerves: Cranial nerves 2-12 intact  Sensation: No sensory loss  Psychiatric   Orientation to person, place, and time: Normal     Mood and affect: Normal           Assessment/ Plan:  76 yo female with a destructive lesion in the right iliac bone  I have referred her to IR for a biopsy of the lesion  There is a soft tissue component to the lesion and I have asked them to aim more for that area  I will see her in follow up 1-2 weeks after the biopsy to review results  We discussed what the potential primaries could be, but I would prefer to have a more meaningful discussion when a biopsy is available  I will also informally confer with my surgical oncology colleague to see if there are any specific consideration when doing the biopsy in the case this is a sarcoma          I spent 60 minutes in chart review, face to face counseling, coordination of care, and documentation

## 2022-07-02 NOTE — PROGRESS NOTES
Oncology Outpatient Consult Note  Socorro Whiteo Karol 75 y o  female MRN: @ Encounter: [id number]        Date:  7/2/2022        CC:  Expansile destructive lesion in right iliac bone      HPI:  Rick Roblero is seen for initial consultation 7/2/2022 regarding an incidentally found explasive destructive lesion in the right iliac bone  She was in the ER in mid June with left sided flank pain and urinary frequency  She has a kidney stone on the left and the iliac lesions was also seen on CT  She has not had a biopsy yet because she was having difficulty getting an order for it, so I agreed to see her without a diagnosis  She had a PET scan at [place] that did not show any other sites of disease  She is otherwise healthy  Her only other medical history is depression  Her labs are normal   She had a normal mammogram in dec 2021 and a normal colonoscopy last year  She denies any pain  She has a dry cough that is improving on its own  She denies any bowel problems or BRBPR  She has fatigue  She doesn't smoke or drink  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 a myeloma survivor  Her famhx his remarkable for:    PGF - colon cancer age 80  Father - prostate cancer age 61 and a small cell carcinoma of the rectum age 79  Brother - prostate cancer age 76  Brother - prostate cancer in his 46s  MGM - uterine cancer in her [de-identified]    No AJ heritage  ROS: As stated in history of present illness otherwise her 14 point review of systems today was negative  ECOG PS: 0        Test Results:    Imaging: CT abdomen pelvis wo contrast    Result Date: 6/14/2022  Narrative: CT ABDOMEN AND PELVIS WITHOUT IV CONTRAST INDICATION:   Flank pain, kidney stone suspected left flank pain  COMPARISON:  None   TECHNIQUE:  CT examination of the abdomen and pelvis was performed without intravenous contrast  This examination was performed without intravenous contrast in the context of the critical nationwide Omnipaque shortage  Axial, sagittal, and coronal 2D reformatted images were created from the source data and submitted for interpretation  Radiation dose length product (DLP) for this visit:  538 36 mGy-cm   This examination, like all CT scans performed in the [facility], was performed utilizing techniques to minimize radiation dose exposure, including the use of iterative  reconstruction and automated exposure control  Enteric contrast was administered  FINDINGS: ABDOMEN LOWER CHEST:  Streaky atelectasis or scarring noted at the lung bases  Moderate to large sliding-type hiatal hernia noted  LIVER/BILIARY TREE:  Unremarkable  GALLBLADDER:  There are gallstone(s) within the gallbladder, without pericholecystic inflammatory changes  SPLEEN:  Unremarkable  PANCREAS:  Unremarkable  ADRENAL GLANDS:  Unremarkable  KIDNEYS/URETERS:  4 mm left UVJ calculus resulting in mild left hydroureteronephrosis  STOMACH AND BOWEL:  Unremarkable  APPENDIX:  A normal appendix was visualized  ABDOMINOPELVIC CAVITY:  No ascites  No pneumoperitoneum  No lymphadenopathy  VESSELS:  Unremarkable for patient's age  PELVIS REPRODUCTIVE ORGANS:  Unremarkable for patient's age  URINARY BLADDER:  Unremarkable  ABDOMINAL WALL/INGUINAL REGIONS:  Unremarkable  OSSEOUS STRUCTURES:  Right iliac bone lytic destructive lesion with soft tissue component measuring approximately 3 5 x 3 9 cm  Chondroid matrix noted  Findings raise suspicion for chondrosarcoma or metastatic disease  No additional bone lesions identified or suspected primary carcinoma  Impression: 1   4 mm left UVJ calculus identified resulting in mild left hydroureteronephrosis without further urinary calculi  2   Expansile destructive right iliac bone lesion with soft tissue component and chondroid matrix measuring up to 3 5 x 3 9 cm 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 without additional bone lesion identified    Follow-up PET/CT would likely be helpful for treatment planning  3   Cholelithiasis  4   Moderate to large hiatal hernia  The study was marked in EPIC for significant notification  Workstation performed: [id number]     XR chest 1 view    Result Date: 6/15/2022  Narrative: CHEST INDICATION:   chronic cough  COMPARISON:  None EXAM PERFORMED/VIEWS:  XR CHEST 1 VIEW 1 image FINDINGS: Cardiomediastinal silhouette appears unremarkable  A hiatal hernia is present  The lungs are clear  No pneumothorax or pleural effusion  Mild dextroscoliosis in the lumbar spine  Impression: No acute cardiopulmonary disease  Workstation performed: [id number]     NM PET outside images    Result Date: 6/28/2022  Narrative: 1 2 840 [id number]  3 156 8 [id number]  4 [id number]      Labs:   Lab Results   Component Value Date    WBC 12 47 (H) 06/14/2022    HGB 13 4 06/14/2022    HCT 41 5 06/14/2022    MCV 92 06/14/2022     06/14/2022     Lab Results   Component Value Date    K 3 8 06/14/2022    CL 98 06/14/2022    CO2 25 06/14/2022    BUN 15 06/14/2022    CREATININE 0 69 06/14/2022    CALCIUM 9 4 06/14/2022    AST 23 06/14/2022    ALT 15 06/14/2022    ALKPHOS 81 06/14/2022    EGFR 89 06/14/2022         No results found for: SPEP, UPEP    No results found for: PSA    No results found for: CEA    No results found for: AFP    No results found for: IRON, TIBC, FERRITIN    No results found for: [id number]            Active Problems:   Patient Active Problem List   Diagnosis    Malignant neoplasm of pelvic bones, sacrum and coccyx ([address] )       Past Medical History: No past medical history on file  Surgical History: No past surgical history on file  Family History:  No family history on file  Cancer-related family history is not on file      Social History:   Social History     Socioeconomic History    Marital status: /Civil Union     Spouse name: Not on file    Number of children: Not on file    Years of education: Not on file    Highest education level: Not on file Occupational History    Not on file   Tobacco Use    Smoking status: Never Smoker    Smokeless tobacco: Never Used   Vaping Use    Vaping Use: Never used   Substance and Sexual Activity    Alcohol use: Not Currently    Drug use: Never    Sexual activity: Not on file   Other Topics Concern    Not on file   Social History Narrative    Not on file     Social Determinants of Health     Financial Resource Strain: Not on file   Food Insecurity: Not on file   Transportation Needs: Not on file   Physical Activity: Not on file   Stress: Not on file   Social Connections: Not on file   Intimate Partner Violence: Not on file   Housing Stability: Not on file       Current Medications:   Current Outpatient Medications   Medication Sig Dispense Refill    Bacillus Coagulans-Inulin (Probiotic) 1-250 BILLION-MG CAPS every 24 hours      Cholecalciferol 25 MCG (1000 UT) capsule Take 1,000 Units by mouth daily      sertraline (ZOLOFT) 50 mg tablet every 24 hours       No current facility-administered medications for this visit  Allergies: Allergies   Allergen Reactions    Ampicillin Rash    Neomycin-Polymyxin-Hc Rash    Sulfa Antibiotics Rash    Sulfamethoxazole-Trimethoprim Rash         Physical Exam:    Body surface area is 1 58 meters squared  Wt Readings from Last 3 Encounters:   06/29/22 64 9 kg (143 lb)   08/08/21 64 9 kg (143 lb)        Temp Readings from Last 3 Encounters:   06/29/22 (!) 97 2 °F (36 2 °C) (Tympanic)   06/14/22 97 7 °F (36 5 °C) (Tympanic)   08/08/21 98 3 °F (36 8 °C)        BP Readings from Last 3 Encounters:   06/29/22 110/66   06/14/22 149/67   08/08/21 118/78         Pulse Readings from Last 3 Encounters:   06/29/22 75   06/14/22 77   08/08/21 76     @LASTSAO2(3)@    Physical Exam     Constitutional   General appearance: No acute distress, well appearing and well nourished  Eyes   Conjunctiva and lids: No swelling, erythema or discharge      Pupils and irises: Equal, round and reactive to light  Ears, Nose, Mouth, and Throat   External inspection of ears and nose: Normal     Nasal mucosa, septum, and turbinates: Normal without edema or erythema  Oropharynx: Normal with no erythema, edema, exudate or lesions  Pulmonary   Respiratory effort: No increased work of breathing or signs of respiratory distress  Auscultation of lungs: Clear to auscultation  Cardiovascular   Palpation of heart: Normal PMI, no thrills  Auscultation of heart: Normal rate and rhythm, normal S1 and S2, without murmurs  Examination of extremities for edema and/or varicosities: Normal     Carotid pulses: Normal     Abdomen   Abdomen: Non-tender, no masses  Liver and spleen: No hepatomegaly or splenomegaly  Lymphatic   Palpation of lymph nodes in neck: No lymphadenopathy  Musculoskeletal   Gait and station: Normal     Digits and nails: Normal without clubbing or cyanosis  Inspection/palpation of joints, bones, and muscles: Normal     Skin   Skin and subcutaneous tissue: Normal without rashes or lesions  Neurologic   Cranial nerves: Cranial nerves 2-12 intact  Sensation: No sensory loss  Psychiatric   Orientation to person, place, and time: Normal     Mood and affect: Normal           Assessment/ Plan:  74 yo female with a destructive lesion in the right iliac bone  I have referred her to IR for a biopsy of the lesion  There is a soft tissue component to the lesion and I have asked them to aim more for that area  I will see her in follow up 1-2 weeks after the biopsy to review results  We discussed what the potential primaries could be, but I would prefer to have a more meaningful discussion when a biopsy is available  I will also informally confer with my surgical oncology colleague to see if there are any specific consideration when doing the biopsy in the case this is a sarcoma          I spent 60 minutes in chart review, face to face counseling, coordination of care, and documentation

## 2022-07-05 ENCOUNTER — TELEPHONE (OUTPATIENT)
Dept: GENETICS | Facility: CLINIC | Age: 67
End: 2022-07-05

## 2022-07-05 NOTE — TELEPHONE ENCOUNTER
Second Attempt:    I called Socorro to schedule a new patient appointment with the [facility]       Outcome:   I left a voice message encouraging the patient to call the genetics team at [phone] to schedule this appointment  Patient had returned call and left a message on the 65 voicemail on Friday July 1  Follow-up:   A letter including instructions on how to schedule and our contact information will be mailed to the home address

## 2022-07-08 ENCOUNTER — HOSPITAL ENCOUNTER (OUTPATIENT)
Dept: CT IMAGING | Facility: HOSPITAL | Age: 67
Discharge: HOME | End: 2022-07-08
Attending: RADIOLOGY | Admitting: RADIOLOGY
Payer: MEDICARE

## 2022-07-08 VITALS
DIASTOLIC BLOOD PRESSURE: 56 MMHG | TEMPERATURE: 97.8 F | HEIGHT: 58 IN | WEIGHT: 140 LBS | HEART RATE: 64 BPM | OXYGEN SATURATION: 97 % | SYSTOLIC BLOOD PRESSURE: 115 MMHG | BODY MASS INDEX: 29.39 KG/M2 | RESPIRATION RATE: 18 BRPM

## 2022-07-08 DIAGNOSIS — R19.00 PELVIC MASS: ICD-10-CM

## 2022-07-08 PROCEDURE — 88341 IMHCHEM/IMCYTCHM EA ADD ANTB: CPT | Performed by: PATHOLOGY

## 2022-07-08 PROCEDURE — 99153 MOD SED SAME PHYS/QHP EA: CPT

## 2022-07-08 PROCEDURE — 99152 MOD SED SAME PHYS/QHP 5/>YRS: CPT

## 2022-07-08 PROCEDURE — 77012 CT SCAN FOR NEEDLE BIOPSY: CPT | Performed by: RADIOLOGY

## 2022-07-08 PROCEDURE — 20220 BONE BIOPSY TROCAR/NDL SUPFC: CPT

## 2022-07-08 PROCEDURE — 88333 PATH CONSLTJ SURG CYTO XM 1: CPT | Performed by: PATHOLOGY

## 2022-07-08 PROCEDURE — 20220 BONE BIOPSY TROCAR/NDL SUPFC: CPT | Performed by: RADIOLOGY

## 2022-07-08 PROCEDURE — 99152 MOD SED SAME PHYS/QHP 5/>YRS: CPT | Performed by: RADIOLOGY

## 2022-07-08 PROCEDURE — 88305 TISSUE EXAM BY PATHOLOGIST: CPT | Performed by: PATHOLOGY

## 2022-07-08 PROCEDURE — 88342 IMHCHEM/IMCYTCHM 1ST ANTB: CPT | Performed by: PATHOLOGY

## 2022-07-08 RX ORDER — MIDAZOLAM HYDROCHLORIDE 2 MG/2ML
INJECTION, SOLUTION INTRAMUSCULAR; INTRAVENOUS CODE/TRAUMA/SEDATION MEDICATION
Status: COMPLETED | OUTPATIENT
Start: 2022-07-08 | End: 2022-07-08

## 2022-07-08 RX ORDER — FENTANYL CITRATE 50 UG/ML
INJECTION, SOLUTION INTRAMUSCULAR; INTRAVENOUS CODE/TRAUMA/SEDATION MEDICATION
Status: COMPLETED | OUTPATIENT
Start: 2022-07-08 | End: 2022-07-08

## 2022-07-08 RX ORDER — LIDOCAINE WITH 8.4% SOD BICARB 0.9%(10ML)
SYRINGE (ML) INJECTION CODE/TRAUMA/SEDATION MEDICATION
Status: COMPLETED | OUTPATIENT
Start: 2022-07-08 | End: 2022-07-08

## 2022-07-08 RX ADMIN — FENTANYL CITRATE 100 MCG: 50 INJECTION INTRAMUSCULAR; INTRAVENOUS at 10:48

## 2022-07-08 RX ADMIN — MIDAZOLAM 2 MG: 1 INJECTION INTRAMUSCULAR; INTRAVENOUS at 10:46

## 2022-07-08 RX ADMIN — Medication 10 ML: at 10:46

## 2022-07-08 NOTE — DISCHARGE INSTRUCTIONS
Bone  Biopsy     WHAT YOU NEED TO KNOW:   A bone  biopsy is a procedure to remove a small amount of  bone  Bone is the soft tissue inside your bone that helps to make blood cells  The sample is tested for disease or infection  DISCHARGE INSTRUCTIONS:     1  Limit your activities day of biopsy as directed by your doctor  2  Use medication as ordered  3  Return to your normal diet  Small sips of flat soda will help with nausea  4  Remove band-aid or dressing 24 hours after procedure  Contact Interventional Radiology at [phone] [place] PATIENTS: Contact Interventional Radiology at [phone]) [facility] PATIENTS: Contact Interventional Radiology at [phone]) if:    1  Difficulty breathing, nausea or vomiting  2  Chills or fever above 101 F     3  Pain at biopsy site not relieved by medication  4  Develop any redness, swelling, heat, unusual drainage, heavy bruising or bleeding from biopsy site

## 2022-07-08 NOTE — SEDATION DOCUMENTATION
Right pelvic bx completed  Band aid to site  Patient tolerated well and transferred back to [facility] in stable condition  Report and care assumed by primary RN

## 2022-07-08 NOTE — BRIEF OP NOTE (RAD/CATH)
INTERVENTIONAL RADIOLOGY PROCEDURE NOTE    Date: 7/8/2022    Procedure: IR BIOPSY PELVIS    Preoperative diagnosis:   1  Pelvic mass         Postoperative diagnosis: Same  Surgeon: Torres Fernández DO     Assistant: None  No qualified resident was available  Blood loss: minimal    Specimens: 4- 18 gauge core of soft tissue component; 1- 12 gauge core of bone component    Findings: specimens placed in formalin    Complications: None immediate      Anesthesia: conscious sedation and local

## 2022-07-08 NOTE — INTERVAL H&P NOTE
Patient arrived to IR for right iliac bone biopsy    The procedure and risks were discussed with the patient  All questions were answered  Informed consent was obtained  H & P reviewed after examining the patient and I find no changes in the patient condition since the H & P has been written  /63   Pulse 75   Temp 97 8 °F (36 6 °C) (Temporal)   Resp 14   Ht 4' 10" (1 473 m)   Wt 63 5 kg (140 lb)   BMI 29 26 kg/m²     Patient re-evaluated   Accept as history and physical     Tea Gao, DO/July 8, 2022/9:51 AM

## 2022-07-11 ENCOUNTER — TELEPHONE (OUTPATIENT)
Dept: HEMATOLOGY ONCOLOGY | Facility: CLINIC | Age: 67
End: 2022-07-11

## 2022-07-11 NOTE — TELEPHONE ENCOUNTER
CALL RETURN FORM   Reason for patient call? Patient would like to confirm that her biopsy samples were adequate  She states someone told her they were "falling apart"  She has another biopsy scheduled Wednesday as a back up and would like to know if she needs to keep it or not    Patient's primary oncologist? Dr Marcel Segal   Name of person the patient was calling for? Dr Marcel Segal   Any additional information to add, if applicable? Call back at [phone]   Informed patient that the message will be forwarded to the team and someone will get back to them as soon as possible    Did you relay this information to the patient?  Yes

## 2022-07-11 NOTE — TELEPHONE ENCOUNTER
Call returned to pt who was wondering if her biopsy results were adequate enough to be sent off  Can we please call IR tomorrow to see if her results are fine to be sent out and then notify the pt please thank you

## 2022-07-12 ENCOUNTER — TELEPHONE (OUTPATIENT)
Dept: HEMATOLOGY ONCOLOGY | Facility: CLINIC | Age: 67
End: 2022-07-12

## 2022-07-12 NOTE — TELEPHONE ENCOUNTER
I returned call to the patient and explained that she will need to wait for the result to be final, which could take approx 7-10 days  Having a second biopsy at another institution is her personal decision, but Dr Az Chapa recommends waiting for these results to come back  Pt appreciated the return call

## 2022-07-12 NOTE — TELEPHONE ENCOUNTER
I called patient to let her know that the iliac bone biopsy did not show cancer  She has another biopsy planned at [facility] tomorrow and is seeing Dr Jessie Levin next week  I was supposed to see her on Monday 7/18 but will move the appt back to Monday 7/25 so that I can possibly have a results by then

## 2022-07-19 ENCOUNTER — CLINICAL SUPPORT (OUTPATIENT)
Dept: GENETICS | Facility: CLINIC | Age: 67
End: 2022-07-19

## 2022-07-19 ENCOUNTER — DOCUMENTATION (OUTPATIENT)
Dept: GENETICS | Facility: CLINIC | Age: 67
End: 2022-07-19

## 2022-07-19 DIAGNOSIS — Z80.42 FAMILY HISTORY OF PROSTATE CANCER: ICD-10-CM

## 2022-07-19 DIAGNOSIS — C41.4 MALIGNANT NEOPLASM OF PELVIC BONES, SACRUM AND COCCYX (HCC): Primary | ICD-10-CM

## 2022-07-19 DIAGNOSIS — Z80.0 FAMILY HISTORY OF COLON CANCER: ICD-10-CM

## 2022-07-19 PROCEDURE — NC001 PR NO CHARGE: Performed by: GENETIC COUNSELOR, MS

## 2022-07-19 NOTE — LETTER
2022     Lencho Jordan, [address] [facility]    Patient: Neil Gonzalez  YOB: 1955  Date of Visit: 2022      Dear Dr Nehal Madison: Thank you for referring Radha Berumen to me for evaluation  Below are my notes for this consultation  If you have questions, please do not hesitate to call me  I look forward to following your patient along with you  Sincerely,        Molly Larsen, GC        CC: No Recipients        Pre-Test Genetic Counseling Consult Note    Patient Name: Neil Gonzalez   /Age: [age] y o  Referring Provider: Lencho Jordan DO    Date of Service: 2022  Genetic Counselor: Marisabel Jimenes MS, [facility] [place]  Interpretation Services: None  [facility]      Socorro was referred to the [address] [place] [address] and Genetic Assessment Program due to her personal history of possible chondrosarcoma and family history of prostate, colon and uterine cancer  She presents today to discuss the possibility of a hereditary cancer syndrome, options for genetic testing, and implications for her and her family  Her  accompanied her to the appointment  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, sacrum and coccyx ([facility] [address] )    Cancer History and Treatment:     Personal History:    Per Dr Aide Dimas Note dated 2022: Incidentally found expansile destructive lesion in the right iliac bone  She was in the ER in mid  with left sided flank pain and urinary frequency  She has a kidney stone on the left and the iliac lesions was also seen on CT  She had a PET scan at [place] that did not show any other sites of disease  2022  Final Diagnosis   A   Bone, Right iliac crest bone biopsy:  -Fragment of bone with trilineage hematopoesis   -Focal area showing dilated blood vessels and marrow hemorrhage  -Focus of hemosiderin laden macrophages highlighted by CD68 stain  Note   stain highlights  rare plasma cells    -No epithelial cells seen  on MCK stain, S100 stains  Controls reacted appropriately      Comment  Since the patient requested to be seen at [place], [place],  This case  will be send out to [facility] fore review and additional work up if needed  Electronically signed by Dipesh Romero MD on 7/12/2022 at  1:28 PM   Note    Intradepartmental consultation is in agreement   Interpretation performed at [facility], [address]   - Flow cytometry ([facility]# [id number], evaluated by Dr Fredrich Lennox ) was cancelled due to low viability and cell yield in the sample analyzed  Interpretation performed at [facility], [address] [facility] notes   IMPRESSION:  1   4 mm left UVJ calculus identified resulting in mild left hydroureteronephrosis without further urinary calculi  2   Expansile destructive right iliac bone lesion with soft tissue component and chondroid matrix measuring up to 3 5 x 3 9 cm 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 without additional bone lesion identified   Follow-up PET/CT would likely   be helpful for treatment planning     Screening Hx:     Breast:  Breast Imaging: Routine Mammogram most recent Dec 2021    Colon:  Colonoscopy: Most recent 2021 with a history of 1-2 colon polyps    Gynecologic:  Ovaries and Uterus intact     Skin:  No current screening    Reproductive History  Age at menarche: 14  Age at first live birth: 33y  Menopause: Post Menopausal (50y)  Hormone replacement: None     Medical and Surgical History  Pertinent surgical history:   Past Surgical History:   Procedure Laterality Date    IR BIOPSY PELVIS  7/8/2022      Pertinent medical history:No past medical history on file        Other History:  Height:   Ht Readings from Last 1 Encounters:   07/08/22 4' 10" (1 473 m)     Weight:   Wt Readings from Last 1 Encounters: 07/08/22 63 5 kg (140 lb)     Relevant Family History   Patient reports no Ashkenazi Mormonism ancestry  *All history is reported as provided by the patient; records are not available for review, except where indicated  Assessment:  We discussed sporadic, familial and hereditary cancer  We also discussed the many factors that influence our risk for cancer such as age, environmental exposures, lifestyle choices and family history  We reviewed the indications suggestive of a hereditary predisposition to cancer  Socorro had a biopsy on 7/8/22 which is 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  A second biopsy was performed last week at [facility] and pathology is pending  We explained that if Socorro has a chondrosarcoma, there is not a known association between chondrosarcoma and hereditary cancer genes  If Socorro's final pathology identifies metastatic disease from another primary site, we can reassess  Despite her personal history, Socorro has a strong family history of prostate cancer including 3 first-degree relatives, one of whom (her father) had metastatic disease  Based on this family history genetic testing is indicated for Socorro based on the following criteria: Meets NCCN V2 2022 Testing Criteria for Prostate Cancer Susceptibility Genes: Family history of a father [facility]) with metastatic prostate cancer      If Socorro's final pathology identifies a chondrosarcoma, we can offer a sarcoma panel of genes to see if there is any hereditary risk for sarcomas in general (I e  not necessarily linked specifically to chondrosarcoma)  The risks, benefits, and limitations of genetic testing were reviewed with the patient, as well as genetic discrimination laws, and possible test results (positive, negative, variants of uncertain significance) and their clinical implications   If positive for a mutation, options for managing cancer risk including increased surveillance, chemoprevention, and in some cases prophylactic surgery were discussed  Socorro was informed that if a hereditary cancer syndrome was identified in her, first degree relatives (parents, siblings, and children) have a chance of also inheriting the condition  Genetic testing would allow for predictive genetic testing in other relatives, who may also be at risk depending on their degree of relation  Plan: Patient decided not to proceed with testing at this time  Summary:     Socorro and her  had many questions and over 90 minutes was spent counseling them and answering their questions  Socorro has a history of anxiety and depression and expressed concern over what she would do with the information learned from this test, specifically related to her daughters and their risks  After a lengthy discussion, Socorro decided that she wanted to take more time to consider this test, discuss it with her  and her daughters before proceeding  We provided Socorro with a hereditary cancer brochure, information on ISMAEL and our contact information  Socorro can call our office at [phone] if/when she is ready to proceed with testing and we will FedEx her a kit and paperwork for a blood draw  Based on her Medicare insurance, we will send Socorro's sample to [facility] and order the Common Hereditary Cancer Panel along with the Sarcoma Panel  In the meantime, Socorro can reach out to our office with any questions and/or concerns

## 2022-07-19 NOTE — PROGRESS NOTES
Pre-Test Genetic Counseling Consult Note    Patient Name: Frank Canchola   /Age: [age] y o  Referring Provider: Nicola Weinberg DO    Date of Service: 2022  Genetic Counselor: Christiana Mendez MS, [facility]  Interpretation Services: None  [facility]      Socorro was referred to the [address] [place] [address] and Genetic Assessment Program due to her personal history of possible chondrosarcoma and family history of prostate, colon and uterine cancer  She presents today to discuss the possibility of a hereditary cancer syndrome, options for genetic testing, and implications for her and her family  Her  accompanied her to the appointment  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 [address] )    Cancer History and Treatment:     Personal History:    Per Dr Edward Collier Note dated 2022: Incidentally found expansile destructive lesion in the right iliac bone  She was in the ER in mid  with left sided flank pain and urinary frequency  She has a kidney stone on the left and the iliac lesions was also seen on CT  She had a PET scan at [facility] that did not show any other sites of disease  2022  Final Diagnosis   A  Bone, Right iliac crest bone biopsy:  -Fragment of bone with trilineage hematopoesis   -Focal area showing dilated blood vessels and marrow hemorrhage  -Focus of hemosiderin laden macrophages highlighted by CD68 stain  Note   stain highlights  rare plasma cells    -No epithelial cells seen  on MCK stain, S100 stains  Controls reacted appropriately      Comment  Since the patient requested to be seen at [place], [place],  This case  will be send out to [address] fore review and additional work up if needed      Electronically signed by Dipesh Romero MD on 2022 at  1:28 PM   Note    Intradepartmental consultation is in agreement   Interpretation performed at [facility], [address]   - Flow cytometry ([facility]# [id number], evaluated by Dr William Tanner ) was cancelled due to low viability and cell yield in the sample analyzed  Interpretation performed at [facility], [address] [facility] notes   IMPRESSION:  1   4 mm left UVJ calculus identified resulting in mild left hydroureteronephrosis without further urinary calculi  2   Expansile destructive right iliac bone lesion with soft tissue component and chondroid matrix measuring up to 3 5 x 3 9 cm 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 without additional bone lesion identified   Follow-up PET/CT would likely   be helpful for treatment planning     Screening Hx:     Breast:  Breast Imaging: Routine Mammogram most recent Dec 2021    Colon:  Colonoscopy: Most recent 2021 with a history of 1-2 colon polyps    Gynecologic:  Ovaries and Uterus intact     Skin:  No current screening    Reproductive History  Age at menarche: 14  Age at first live birth: 33y  Menopause: Post Menopausal (50y)  Hormone replacement: None     Medical and Surgical History  Pertinent surgical history:   Past Surgical History:   Procedure Laterality Date    IR BIOPSY PELVIS  7/8/2022      Pertinent medical history:No past medical history on file  Other History:  Height:   Ht Readings from Last 1 Encounters:   07/08/22 4' 10" (1 473 m)     Weight:   Wt Readings from Last 1 Encounters:   07/08/22 63 5 kg (140 lb)     Relevant Family History   Patient reports no Ashkenazi Anglican ancestry  *All history is reported as provided by the patient; records are not available for review, except where indicated  Assessment:  We discussed sporadic, familial and hereditary cancer  We also discussed the many factors that influence our risk for cancer such as age, environmental exposures, lifestyle choices and family history        We reviewed the indications suggestive of a hereditary predisposition to cancer  Socorro had a biopsy on 7/8/22 which is 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  A second biopsy was performed last week at [facility] and pathology is pending  We explained that if Socorro has a chondrosarcoma, there is not a known association between chondrosarcoma and hereditary cancer genes  If Socorro's final pathology identifies metastatic disease from another primary site, we can reassess  Despite her personal history, Socorro has a strong family history of prostate cancer including 3 first-degree relatives, one of whom (her father) had metastatic disease  Based on this family history genetic testing is indicated for Socorro based on the following criteria: Meets NCCN V2 2022 Testing Criteria for Prostate Cancer Susceptibility Genes: Family history of a father [facility]) with metastatic prostate cancer      If Socorro's final pathology identifies a chondrosarcoma, we can offer a sarcoma panel of genes to see if there is any hereditary risk for sarcomas in general (I e  not necessarily linked specifically to chondrosarcoma)  The risks, benefits, and limitations of genetic testing were reviewed with the patient, as well as genetic discrimination laws, and possible test results (positive, negative, variants of uncertain significance) and their clinical implications  If positive for a mutation, options for managing cancer risk including increased surveillance, chemoprevention, and in some cases prophylactic surgery were discussed  Socorro was informed that if a hereditary cancer syndrome was identified in her, first degree relatives (parents, siblings, and children) have a chance of also inheriting the condition  Genetic testing would allow for predictive genetic testing in other relatives, who may also be at risk depending on their degree of relation  Plan: Patient decided not to proceed with testing at this time      Summary:     Socorro and her  had many questions and over 90 minutes was spent counseling them and answering their questions  Socorro has a history of anxiety and depression and expressed concern over what she would do with the information learned from this test, specifically related to her daughters and their risks  After a lengthy discussion, Socorro decided that she wanted to take more time to consider this test, discuss it with her  and her daughters before proceeding  We provided Socorro with a hereditary cancer brochure, information on ISMAEL and our contact information  Socorro can call our office at [phone] if/when she is ready to proceed with testing and we will FedEx her a kit and paperwork for a blood draw  Based on her Medicare insurance, we will send Socorro's sample to [facility] and order the Common Hereditary Cancer Panel along with the Sarcoma Panel  In the meantime, Socorro can reach out to our office with any questions and/or concerns

## 2022-07-25 ENCOUNTER — OFFICE VISIT (OUTPATIENT)
Dept: HEMATOLOGY ONCOLOGY | Facility: HOSPITAL | Age: 67
End: 2022-07-25
Payer: MEDICARE

## 2022-07-25 VITALS
RESPIRATION RATE: 18 BRPM | OXYGEN SATURATION: 98 % | BODY MASS INDEX: 29.39 KG/M2 | DIASTOLIC BLOOD PRESSURE: 74 MMHG | WEIGHT: 140 LBS | HEART RATE: 73 BPM | TEMPERATURE: 97.7 F | HEIGHT: 58 IN | SYSTOLIC BLOOD PRESSURE: 112 MMHG

## 2022-07-25 DIAGNOSIS — C41.4 MALIGNANT NEOPLASM OF PELVIC BONES, SACRUM AND COCCYX (HCC): Primary | ICD-10-CM

## 2022-07-25 PROCEDURE — 99214 OFFICE O/P EST MOD 30 MIN: CPT | Performed by: INTERNAL MEDICINE

## 2022-07-25 PROCEDURE — 1124F ACP DISCUSS-NO DSCNMKR DOCD: CPT | Performed by: INTERNAL MEDICINE

## 2022-07-28 NOTE — PROGRESS NOTES
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  She is seeing an orthopedic oncologist at [facility]  She has some pain after the 2 biopsies, but otherwise is completely asymptomatic  REVIEW OF SYSTEMS:  Please note that a 14-point review of systems was performed to include Constitutional, HEENT, Respiratory, CVS, GI, , Musculoskeletal, Integumentary, Neurologic, Rheumatologic, Endocrinologic, Psychiatric, Lymphatic, and Hematologic/Oncologic systems were reviewed and are negative unless otherwise stated in HPI  Positive and negative findings pertinent to this evaluation are incorporated into the history of present illness  ECOG PS: 0    PROBLEM LIST:  Patient Active Problem List:    Malignant neoplasm of pelvic bones, sacrum and coccyx ([facility] [address] )      Assessment / Plan: the options that have been presented to the patient are surgical resection vs repeat surgical bx vs observation  If this is EHE, it is a low grade malignancy and can be observed, so I would favor that  Going forward, she is going to follow up with [facility]  I will be available on an as needed basis  Sometimes pazopanib can play a role in the neoadjuvant setting, so if she needs local care, I am happy to provide that  I answered all of her questions to the best of my ability  I spent 30 minutes on chart review, face to face counseling time, coordination of care and documentation  Past Medical History:  has no past medical history on file  PAST SURGICAL HISTORY:  has a past surgical history that includes IR biopsy pelvis (2022)      CURRENT MEDICATIONS  Current Outpatient Medications:  Bacillus Coagulans-Inulin (Probiotic) 1-250 BILLION-MG CAPS, every 24 hours, Disp: , Rfl:   Cholecalciferol 25 MCG (1000 UT) capsule, Take 1,000 Units by mouth daily, Disp: , Rfl:   sertraline (ZOLOFT) 50 mg tablet, every 24 hours, Disp: , Rfl:     No current facility-administered medications for this visit  [unfilled]    SOCIAL HISTORY:  reports that she has never smoked  She has never used smokeless tobacco  She reports previous alcohol use  She reports that she does not use drugs  FAMILY HISTORY:  family history is not on file  ALLERGIES:  is allergic to ampicillin, neomycin-polymyxin-hc, sulfa antibiotics, and sulfamethoxazole-trimethoprim  Physical Exam:  Vital Signs:   /74 (BP Location: Left arm)   Pulse 73   Temp 97 7 °F (36 5 °C) (Tympanic)   Resp 18   Ht 4' 10" (1 473 m)   Wt 63 5 kg (140 lb)   SpO2 98%   BMI 29 26 kg/m²   OB Status Postmenopausal   Smoking Status Never Smoker   BSA 1 57 m²   Body mass index is 29 26 kg/m²  Body surface area is 1 57 meters squared  GEN: Alert, awake oriented x3, in no acute distress  HEENT- No pallor, icterus, cyanosis, no oral mucosal lesions,   LAD - no palpable cervical, clavicle, axillary, inguinal LAD  Heart- normal S1 S2, regular rate and rhythm, No murmur, rubs     Lungs- clear breathing sound bilateral    Abdomen- soft, Non tender, bowel sounds present  Extremities- No cyanosis, clubbing, edema  Neuro- No focal neurological deficit    Labs:  Lab Results      Component                Value               Date                      WBC                      12 47 (H)           06/14/2022                HGB                      13 4                06/14/2022                HCT                      41 5                06/14/2022                MCV                      92                  06/14/2022                PLT                      353                 06/14/2022            Lab Results      Component                Value               Date SODIUM                   134 (L)             06/14/2022                K                        3 8                 06/14/2022                CL                       98                  06/14/2022                CO2                      25                  06/14/2022                AGAP                     11                  06/14/2022                BUN                      15                  06/14/2022                CREATININE               0 69                06/14/2022                GLUC                     107                 06/14/2022                CALCIUM                  9 4                 06/14/2022                AST                      23                  06/14/2022                ALT                      15                  06/14/2022                ALKPHOS                  81                  06/14/2022                TP                       7 8                 06/14/2022                TBILI                    0 58                06/14/2022                EGFR                     89                  06/14/2022